# Patient Record
Sex: MALE | Race: WHITE
[De-identification: names, ages, dates, MRNs, and addresses within clinical notes are randomized per-mention and may not be internally consistent; named-entity substitution may affect disease eponyms.]

---

## 2021-10-08 ENCOUNTER — HOSPITAL ENCOUNTER (EMERGENCY)
Dept: HOSPITAL 97 - ER | Age: 81
Discharge: HOME | End: 2021-10-08
Payer: COMMERCIAL

## 2021-10-08 VITALS — OXYGEN SATURATION: 100 %

## 2021-10-08 VITALS — TEMPERATURE: 97.5 F

## 2021-10-08 VITALS — DIASTOLIC BLOOD PRESSURE: 70 MMHG | SYSTOLIC BLOOD PRESSURE: 113 MMHG

## 2021-10-08 DIAGNOSIS — Z91.041: ICD-10-CM

## 2021-10-08 DIAGNOSIS — K57.32: Primary | ICD-10-CM

## 2021-10-08 DIAGNOSIS — I10: ICD-10-CM

## 2021-10-08 DIAGNOSIS — Z88.0: ICD-10-CM

## 2021-10-08 LAB
ALBUMIN SERPL BCP-MCNC: 3.5 G/DL (ref 3.4–5)
ALP SERPL-CCNC: 92 U/L (ref 45–117)
ALT SERPL W P-5'-P-CCNC: 31 U/L (ref 12–78)
AST SERPL W P-5'-P-CCNC: 23 U/L (ref 15–37)
BUN BLD-MCNC: 17 MG/DL (ref 7–18)
GLUCOSE SERPLBLD-MCNC: 109 MG/DL (ref 74–106)
HCT VFR BLD CALC: 41.4 % (ref 39.6–49)
LIPASE SERPL-CCNC: 155 U/L (ref 73–393)
LYMPHOCYTES # SPEC AUTO: 1.2 K/UL (ref 0.7–4.9)
PMV BLD: 7.9 FL (ref 7.6–11.3)
POTASSIUM SERPL-SCNC: 4.1 MMOL/L (ref 3.5–5.1)
RBC # BLD: 4.61 M/UL (ref 4.33–5.43)

## 2021-10-08 PROCEDURE — 96375 TX/PRO/DX INJ NEW DRUG ADDON: CPT

## 2021-10-08 PROCEDURE — 96365 THER/PROPH/DIAG IV INF INIT: CPT

## 2021-10-08 PROCEDURE — 99284 EMERGENCY DEPT VISIT MOD MDM: CPT

## 2021-10-08 PROCEDURE — 80076 HEPATIC FUNCTION PANEL: CPT

## 2021-10-08 PROCEDURE — 96361 HYDRATE IV INFUSION ADD-ON: CPT

## 2021-10-08 PROCEDURE — 81003 URINALYSIS AUTO W/O SCOPE: CPT

## 2021-10-08 PROCEDURE — 81015 MICROSCOPIC EXAM OF URINE: CPT

## 2021-10-08 PROCEDURE — 36415 COLL VENOUS BLD VENIPUNCTURE: CPT

## 2021-10-08 PROCEDURE — 74176 CT ABD & PELVIS W/O CONTRAST: CPT

## 2021-10-08 PROCEDURE — 83690 ASSAY OF LIPASE: CPT

## 2021-10-08 PROCEDURE — 80048 BASIC METABOLIC PNL TOTAL CA: CPT

## 2021-10-08 PROCEDURE — 85025 COMPLETE CBC W/AUTO DIFF WBC: CPT

## 2021-10-08 PROCEDURE — 96367 TX/PROPH/DG ADDL SEQ IV INF: CPT

## 2021-10-08 NOTE — EDPHYS
Physician Documentation                                                                           

 [facility]                                                                 

Name: Mike Muller                                                                                

Age: 81 yrs                                                                                       

Sex: Male                                                                                         

: 1940                                                                                   

MRN: [id number]                                                                                   

Arrival Date: 10/08/2021                                                                          

Time: 10:42                                                                                       

Account#: [id number]                                                                            

Bed 10                                                                                            

Private MD:                                                                                       

ED Physician Jb Olivo                                                                         

HPI:                                                                                              

10/08                                                                                             

11:10 This 81 yrs old  Male presents to ER via Ambulatory with complaints of         rn  

      Abdominal Pain.                                                                             

11:10 The patient presents with abdominal pain in the left lower quadrant. Onset: The         rn  

      symptoms/episode began/occurred 2 day(s) ago. The symptoms do not radiate. Associated       

      signs and symptoms: Pertinent positives: constipation, Pertinent negatives: blood in        

      stools, fever. The symptoms are described as achy, crampy. Modifying factors: The           

      symptoms are alleviated by nothing, the symptoms are aggravated by touching the area.       

      Severity of pain: At its worst the pain was mild in the emergency department the pain       

      is unchanged. The patient has not experienced similar symptoms in the past. The patient     

      has not recently seen a physician. Patient reports left lower quadrant abdominal pain       

      that began 2 days ago, initially felt like was getting better but then worse again          

      today. Decreased appetite. Positive for constipation. No blood in stool or dark stool.      

                                                                                                  

Historical:                                                                                       

- Allergies:                                                                                      

10:49 PENICILLINS;                                                                            ll1 

10:49 Iodinated Contrast Media - IV Dye;                                                      ll1 

- PMHx:                                                                                           

10:49 GERD; Hyperlipidemia; Hypertension; Pancreatitis;                                       ll1 

- PSHx:                                                                                           

10:49 Cholecystectomy;                                                                        ll1 

                                                                                                  

- Immunization history:: Client reports receiving the 2nd dose of the Covid vaccine.              

- Social history:: Smoking status: Patient denies any tobacco usage or history of.                

- Family history:: not pertinent.                                                                 

- Hospitalizations: : No recent hospitalization is reported.                                      

                                                                                                  

                                                                                                  

ROS:                                                                                              

11:10 Constitutional: Negative for fever, chills, and weight loss, Eyes: Negative for injury, rn  

      pain, redness, and discharge, Neck: Negative for injury, pain, and swelling,                

      Cardiovascular: Negative for chest pain, palpitations, and edema, Respiratory: Negative     

      for shortness of breath, cough, wheezing, and pleuritic chest pain, Abdomen/GI:             

      Positive for left lower quadrant abdominal pain and anorexia as well as constipation        

      : Negative for injury, bleeding, discharge, and swelling, MS/Extremity: Negative for      

      injury and deformity, Skin: Negative for injury, rash, and discoloration, Neuro:            

      Negative for headache, weakness, numbness, tingling, and seizure.                           

11:10 All other systems are negative.                                                             

                                                                                                  

Exam:                                                                                             

11:10 Constitutional:  This is a well developed, well nourished patient who is awake, alert,  rn  

      and in no acute distress. Head/Face:  Normocephalic, atraumatic. Eyes:  Periorbital         

      areas with no swelling, redness, or edema. Cardiovascular:  Regular rate and rhythm.        

      No pulse deficits. Respiratory:  No increased work of breathing, no retractions or          

      nasal flaring. Abdomen/GI:  Soft, mild left lower quadrant tenderness without               

      peritoneal signs, no masses Skin:  Warm, dry  MS/ Extremity:  Pulses equal, no              

      cyanosis. Neuro:  Awake and alert, GCS 15                                                   

                                                                                                  

Vital Signs:                                                                                      

10:48  / 77; Pulse 75; Resp 17; Temp 97.5; Pulse Ox 96% ; Weight 77.11 kg; Height 5 ft. ll1 

      5 in. (165.10 cm); Pain 4/10;                                                               

12:20  / 71; Pulse 65; Resp 16; Pulse Ox 100% ;                                         vg1 

13:04  / 73; Pulse 65; Resp 16; Pulse Ox 100% ;                                         vg1 

13:50  / 67; Pulse 64; Resp 16; Pulse Ox 100% ;                                         vg1 

14:57  / 70; Pulse 62; Resp 16; Pulse Ox 100% ;                                         vg1 

10:48 Body Mass Index 28.29 (77.11 kg, 165.10 cm)                                             ll1 

                                                                                                  

MDM:                                                                                              

10:53 Patient medically screened.                                                             rn  

13:51 Differential diagnosis: appendicitis, bowel obstruction, diverticulitis, non-specific   rn  

      abd pain, Ureterolithiasis, urinary tract infection. Data reviewed: vital signs, nurses     

      notes, lab test result(s), radiologic studies, CT scan, and as a result, I will             

      discharge patient. Data interpreted: Cardiac monitor: rate is 64 beats/min, rhythm is       

      normal sinus rhythm, regular, with no ectopy, Interpretation: normal rate, normal           

      rhythm, Pulse oximetry: on room air is 100 %. Interpretation: normal. Counseling: I had     

      a detailed discussion with the patient and/or guardian regarding: the historical            

      points, exam findings, and any diagnostic results supporting the discharge/admit            

      diagnosis, lab results, radiology results, the need for outpatient follow up, to return     

      to the emergency department if symptoms worsen or persist or if there are any questions     

      or concerns that arise at home. Response to treatment: the patient's symptoms have          

      markedly improved after treatment, Reclined in bed with legs crossed and looks very         

      comfortable, and as a result, I will discharge patient. Special discussion: Based on        

      the patient's Hx, exam, and Dx evaluation, there is no indication for emergent surgery      

      or inpatient Tx. It is understood by the patient/guardian that if the Sx's persist or       

      worsen they need to return immediately for re-evaluation. I discussed with the              

      patient/guardian in detail that at this point there is no indication for admission to       

      the hospital. It is understood, however, that if the symptoms persist or worsen the         

      patient needs to return immediately for re-evaluation. Based on the history and exam        

      findings, there is no indication for further emergent testing or inpatient evaluation.      

      I discussed with the patient/guardian the need to see the primary care provider for         

      further evaluation of the symptoms. ED course: CT shows mild to moderate                    

      diverticulitis. Patient with normal vitals and well-appearing. Given IV antibiotics         

      here. Will DC home with antibiotics and return precautions given and understood.            

      Patient happy that he is going home..                                                       

                                                                                                  

10/08                                                                                             

10:58 Order name: Basic Metabolic Panel; Complete Time: 13:03                                 rn  

10/08                                                                                             

10:58 Order name: CBC with Diff; Complete Time: 12:38                                         rn  

10/08                                                                                             

10:58 Order name: Hepatic Function; Complete Time: 13:03                                      rn  

10/08                                                                                             

10:58 Order name: Lipase; Complete Time: 13:03                                                rn  

10/08                                                                                             

10:58 Order name: Urine Microscopic Only; Complete Time: 13:03                                rn  

10/08                                                                                             

11:33 Order name: Urine Dipstick-Ancillary; Complete Time: 12:38                              EDMS

10/08                                                                                             

10:58 Order name: IV Saline Lock; Complete Time: 11:23                                        rn  

10/08                                                                                             

10:58 Order name: Labs collected and sent; Complete Time: 11:23                               rn  

10/08                                                                                             

12:42 Order name: Abdomen ; Complete Time: 13:03                                              EDMS

10/08                                                                                             

10:58 Order name: Urine Dipstick-Ancillary (obtain specimen); Complete Time: 11:33            rn  

                                                                                                  

Administered Medications:                                                                         

11:18 Drug: Zofran (Ondansetron) 4 mg Route: IVP; Site: right wrist;                          vg1 

12:20 Follow up: Response: No adverse reaction                                                vg1 

11:18 Drug: NS 0.9% 500 ml Route: IV; Rate: bolus; Site: right wrist;                         vg1 

12:19 Follow up: IV Status: Completed infusion; IV Intake: 500ml                              vg1 

13:13 Drug: Flagyl (metroNIDAZOLE) 500 mg Volume: 100 ml; Route: IVPB; Rate: 200 ml/hr;       vg1 

      Infused Over: 30 mins; Site: right wrist;                                                   

13:49 Follow up: IV Status: Completed infusion; IV Intake: 100ml                              vg1 

13:50 Drug: Cipro (ciprofloxacin) 400 mg Volume: 200 ml; Route: IVPB; Infused Over: 60 mins;  vg1 

      Site: right wrist;                                                                          

14:56 Follow up: IV Status: Completed infusion; IV Intake: 200ml                              vg1 

                                                                                                  

                                                                                                  

Disposition Summary:                                                                              

10/08/21 13:52                                                                                    

Discharge Ordered                                                                                 

      Location: Home                                                                          rn  

      Problem: new                                                                            rn  

      Symptoms: have improved                                                                 rn  

      Condition: Stable                                                                       rn  

      Diagnosis                                                                                   

        - Diverticulitis of large intestine without perforation or abscess without bleeding   rn  

      Followup:                                                                               rn  

        - With: Private Physician                                                                  

        - When: 2 - 3 days                                                                         

        - Reason: Recheck today's complaints, Re-evaluation by your physician                      

      Discharge Instructions:                                                                     

        - Discharge Summary Sheet                                                             rn  

        - High-Fiber Diet                                                                     rn  

        - Diverticulitis                                                                      rn  

      Forms:                                                                                      

        - Medication Reconciliation Form                                                      rn  

        - Thank You Letter                                                                    rn  

        - Antibiotic Education                                                                rn  

        - Prescription Opioid Use                                                             rn  

      Prescriptions:                                                                              

        - ondansetron 4 mg Oral tablet,disintegrating                                              

            - take 1 tablet by ORAL route every 8 hours As needed; 20 tablet; Refills: 0,     rn  

      Product Selection Permitted                                                                 

        - Flagyl 500 mg Oral Tablet                                                                

            - take 1 tablet by ORAL route every 8 hours for 10 days; 30 tablet; Refills: 0,   rn  

      Product Selection Permitted                                                                 

        - Cipro 500 mg Oral Tablet                                                                 

            - take 1 tablet by ORAL route every 12 hours for 7 days; 14 tablet; Refills: 0,   rn  

      Product Selection Permitted                                                                 

        - Tramadol 50 mg Oral Tablet                                                               

            - take 1 tablet by ORAL route every 8 hours as needed; 15 tablet; Refills: 0,     rn  

      Product Selection Permitted                                                                 

Signatures:                                                                                       

Dispatcher [employer]                           EDMS                                                 

Jb Olivo MD MD rn Garcia, Victoria RN                    RN   vg1                                                  

Cindy Nevarez RN                       RN   ll1                                                  

                                                                                                  

Corrections: (The following items were deleted from the chart)                                    

12:42 10:59 Abdomen Pelvis W [id number] ordered. EDMS                                     EDMS

                                                                                                  

**************************************************************************************************

## 2021-10-08 NOTE — RAD REPORT
EXAM DESCRIPTION:  CT - Abdomen   Pelvis Wo Contrast - 10/8/2021 12:44 pm

 

CLINICAL HISTORY:  Abdominal  pain

 

COMPARISON:  2016

 

TECHNIQUE:  Computed axial tomography of the abdomen and pelvis was obtained. IV and oral contrast we
re not requested.

 

All CT scans are performed using dose optimization technique as appropriate and may include automated
 exposure control or mA/KV adjustment according to patient size.

 

FINDINGS:   The evaluation of solid organs, vessels and bowel is limited secondary to the lack of con
trast administration.

 

2.1 centimeter hepatic cyst.

 

Cholecystectomy.

 

Spleen, pancreas and adrenals appear grossly normal.

 

3 centimeter low-density mass left kidney probably a cyst. Right kidney grossly normal.

 

4.8 centimeter diverticulum stems from the third portion of the duodenum.

 

Normal appendix

 

Diverticula stem from the colon. Mild to moderate stranding adjacent to the sigmoid. No free air. No 
abscess

 

Prostate gland is mildly to moderately enlarged. Small bilateral inguinal hernias. Small bladder dive
rticulum suspected

 

Small hiatal hernia

 

IMPRESSION:  Mild to moderate sigmoid diverticulitis

## 2021-10-08 NOTE — ER
Nurse's Notes                                                                                     

 [facility]                                                                 

Name: Mike Muller                                                                                

Age: 81 yrs                                                                                       

Sex: Male                                                                                         

: 1940                                                                                   

MRN: [id number]                                                                                   

Arrival Date: 10/08/2021                                                                          

Time: 10:42                                                                                       

Account#: [id number]                                                                            

Bed 10                                                                                            

Private MD:                                                                                       

Diagnosis: Diverticulitis of large intestine without perforation or abscess without bleeding      

                                                                                                  

Presentation:                                                                                     

10/08                                                                                             

10:48 Chief complaint: Patient states: Lower abd pain with constipation for 3-4 days. Sent by [facility] 

      Dr. Slaughter for further eval. Coronavirus screen: Client denies travel out of the [place]       

      in the last 14 days. At this time, the client does not indicate any symptoms associated     

      with coronavirus-19. Ebola Screen: Patient denies travel to an Ebola-affected area in       

      the 21 days before illness onset. Initial Sepsis Screen: Does the patient meet any 2        

      criteria? No. Patient's initial sepsis screen is negative. Does the patient have a          

      suspected source of infection? Yes: Acute abdominal pain. Risk Assessment: Do you want      

      to hurt yourself or someone else? Patient reports no desire to harm self or others.         

      Onset of symptoms was 2021.                                                     

10:48 Method Of Arrival: Ambulatory                                                           [facility] 

10:48 Acuity: ELAINE 3                                                                           ll1 

                                                                                                  

Historical:                                                                                       

- Allergies:                                                                                      

10:49 PENICILLINS;                                                                            ll1 

10:49 Iodinated Contrast Media - IV Dye;                                                      ll1 

- PMHx:                                                                                           

10:49 GERD; Hyperlipidemia; Hypertension; Pancreatitis;                                       ll1 

- PSHx:                                                                                           

10:49 Cholecystectomy;                                                                        ll1 

                                                                                                  

- Immunization history:: Client reports receiving the 2nd dose of the Covid vaccine.              

- Social history:: Smoking status: Patient denies any tobacco usage or history of.                

- Family history:: not pertinent.                                                                 

- Hospitalizations: : No recent hospitalization is reported.                                      

                                                                                                  

                                                                                                  

Screenin:26 Abuse screen: Denies threats or abuse. Nutritional screening: No deficits noted.        vg1 

      Tuberculosis screening: No symptoms or risk factors identified. Fall Risk No fall in        

      past 12 months (0 pts). No secondary diagnosis (0 pts). IV access (20 points).              

      Ambulatory Aid- Gait- Normal/Bed Rest/Wheelchair (0 pts) Mental Status- Oriented to own     

      ability (0 pts). Total Box Fall Scale indicates No Risk (0-24 pts).                       

                                                                                                  

Assessment:                                                                                       

11:10 General: Appears in no apparent distress. comfortable, Behavior is calm, cooperative.   vg1 

      Pain: Complains of pain in right lower quadrant and left lower quadrant Pain currently      

      is 4 out of 10 on a pain scale. Pain began 2-3 days ago. Neuro: Level of Consciousness      

      is awake, alert, obeys commands, Oriented to person, place, time, situation.                

      Cardiovascular: Patient's skin is warm and dry. Respiratory: Airway is patent               

      Respiratory effort is even, unlabored. GI: Bowel sounds present X 4 quads. Abd is soft      

      and non tender Reports constipation, BM this morning, states was 'hard' Patient             

      currently denies diarrhea, nausea, vomiting. : No signs and/or symptoms were reported     

      regarding the genitourinary system. EENT: No signs and/or symptoms were reported            

      regarding the EENT system. Derm: Skin is intact, is healthy with good turgor.               

      Musculoskeletal: Circulation, motion, and sensation intact.                                 

12:20 Reassessment: Patient appears in no apparent distress at this time. No changes from     vg1 

      previously documented assessment. Patient and/or family updated on plan of care and         

      expected duration. Pain level reassessed. Patient is alert, oriented x 3, equal             

      unlabored respirations, skin warm/dry/pink.                                                 

13:04 Reassessment: Patient appears in no apparent distress at this time. Patient and/or      vg1 

      family updated on plan of care and expected duration. Pain level reassessed. Patient is     

      alert, oriented x 3, equal unlabored respirations, skin warm/dry/pink. Patient states       

      feeling better.                                                                             

13:50 Reassessment: Patient appears in no apparent distress at this time. Patient and/or      vg1 

      family updated on plan of care and expected duration. Pain level reassessed. Patient is     

      alert, oriented x 3, equal unlabored respirations, skin warm/dry/pink.                      

13:53 Reassessment: Pt up for d/c, currently waiting for IV antibiotics to complete.          vg1 

14:57 Reassessment: Patient appears in no apparent distress at this time. Patient and/or      vg1 

      family updated on plan of care and expected duration. Pain level reassessed. Patient is     

      alert, oriented x 3, equal unlabored respirations, skin warm/dry/pink. Patient states       

      feeling better.                                                                             

                                                                                                  

Vital Signs:                                                                                      

10:48  / 77; Pulse 75; Resp 17; Temp 97.5; Pulse Ox 96% ; Weight 77.11 kg; Height 5 ft. ll1 

      5 in. (165.10 cm); Pain 4/10;                                                               

12:20  / 71; Pulse 65; Resp 16; Pulse Ox 100% ;                                         vg1 

13:04  / 73; Pulse 65; Resp 16; Pulse Ox 100% ;                                         vg1 

13:50  / 67; Pulse 64; Resp 16; Pulse Ox 100% ;                                         vg1 

14:57  / 70; Pulse 62; Resp 16; Pulse Ox 100% ;                                         vg1 

10:48 Body Mass Index 28.29 (77.11 kg, 165.10 cm)                                             ll1 

                                                                                                  

ED Course:                                                                                        

10:42 Patient arrived in ED.                                                                  rg4 

10:47 Jb Olivo MD is Attending Physician.                                                rn  

10:49 Triage completed.                                                                       ll1 

10:50 Iliana Damon RN is Primary Nurse.                                                  vg1 

10:50 Arm band placed on Patient placed.                                                      ll1 

11:23 Initial lab(s) drawn, by me, sent to lab. Inserted saline lock: 20 gauge in right       vg1 

      wrist, using aseptic technique. Blood collected.                                            

11:26 Patient has correct armband on for positive identification. Bed in low position. Call   vg1 

      light in reach. Side rails up X 1. Adult w/ patient.                                        

11:26 No provider procedures requiring assistance completed.                                  vg1 

12:43 Abdomen In Process Unspecified.                                                         EDMS

14:56 IV discontinued, intact, bleeding controlled, No redness/swelling at site. Pressure     vg1 

      dressing applied.                                                                           

                                                                                                  

Administered Medications:                                                                         

11:18 Drug: Zofran (Ondansetron) 4 mg Route: IVP; Site: right wrist;                          vg1 

12:20 Follow up: Response: No adverse reaction                                                vg1 

11:18 Drug: NS 0.9% 500 ml Route: IV; Rate: bolus; Site: right wrist;                         vg1 

12:19 Follow up: IV Status: Completed infusion; IV Intake: 500ml                              vg1 

13:13 Drug: Flagyl (metroNIDAZOLE) 500 mg Volume: 100 ml; Route: IVPB; Rate: 200 ml/hr;       vg1 

      Infused Over: 30 mins; Site: right wrist;                                                   

13:49 Follow up: IV Status: Completed infusion; IV Intake: 100ml                              vg1 

13:50 Drug: Cipro (ciprofloxacin) 400 mg Volume: 200 ml; Route: IVPB; Infused Over: 60 mins;  vg1 

      Site: right wrist;                                                                          

14:56 Follow up: IV Status: Completed infusion; IV Intake: 200ml                              vg1 

                                                                                                  

                                                                                                  

Intake:                                                                                           

12:19 IV: 500ml; Total: 500ml.                                                                vg1 

13:49 IV: 100ml; Total: 600ml.                                                                vg1 

14:56 IV: 200ml; Total: 800ml.                                                                vg1 

                                                                                                  

Outcome:                                                                                          

13:52 Discharge ordered by MD.                                                                rn  

14:56 Discharged to home ambulatory, with family.                                             vg1 

14:56 Condition: stable                                                                           

14:56 Discharge instructions given to patient, family, Instructed on discharge instructions,      

      follow up and referral plans. medication usage, Demonstrated understanding of               

      instructions, follow-up care, medications, Prescriptions given X 4.                         

14:57 Patient left the ED.                                                                    vg1 

                                                                                                  

Signatures:                                                                                       

Dispatcher [employer]                           EDMS                                                 

Jb Olivo MD MD rn Garcia, Rubi rg4                                                  

Iliana Damon RN                    RN   vg1                                                  

Cindy Nevarez RN                       RN   ll1                                                  

                                                                                                  

**************************************************************************************************

## 2021-10-13 ENCOUNTER — HOSPITAL ENCOUNTER (EMERGENCY)
Dept: HOSPITAL 97 - ER | Age: 81
Discharge: HOME | End: 2021-10-13
Payer: COMMERCIAL

## 2021-10-13 VITALS — SYSTOLIC BLOOD PRESSURE: 133 MMHG | OXYGEN SATURATION: 97 % | DIASTOLIC BLOOD PRESSURE: 84 MMHG

## 2021-10-13 VITALS — TEMPERATURE: 98.3 F

## 2021-10-13 DIAGNOSIS — I10: ICD-10-CM

## 2021-10-13 DIAGNOSIS — K57.32: Primary | ICD-10-CM

## 2021-10-13 DIAGNOSIS — Z91.041: ICD-10-CM

## 2021-10-13 DIAGNOSIS — Z88.0: ICD-10-CM

## 2021-10-13 LAB
ALBUMIN SERPL BCP-MCNC: 4 G/DL (ref 3.4–5)
ALP SERPL-CCNC: 95 U/L (ref 45–117)
ALT SERPL W P-5'-P-CCNC: 68 U/L (ref 12–78)
AST SERPL W P-5'-P-CCNC: 67 U/L (ref 15–37)
BUN BLD-MCNC: 12 MG/DL (ref 7–18)
GLUCOSE SERPLBLD-MCNC: 132 MG/DL (ref 74–106)
HCT VFR BLD CALC: 45.8 % (ref 39.6–49)
LIPASE SERPL-CCNC: 169 U/L (ref 73–393)
LYMPHOCYTES # SPEC AUTO: 0.8 K/UL (ref 0.7–4.9)
PMV BLD: 7.6 FL (ref 7.6–11.3)
POTASSIUM SERPL-SCNC: 3.7 MMOL/L (ref 3.5–5.1)
RBC # BLD: 5.11 M/UL (ref 4.33–5.43)

## 2021-10-13 PROCEDURE — 80048 BASIC METABOLIC PNL TOTAL CA: CPT

## 2021-10-13 PROCEDURE — 74177 CT ABD & PELVIS W/CONTRAST: CPT

## 2021-10-13 PROCEDURE — 80076 HEPATIC FUNCTION PANEL: CPT

## 2021-10-13 PROCEDURE — 36415 COLL VENOUS BLD VENIPUNCTURE: CPT

## 2021-10-13 PROCEDURE — 83690 ASSAY OF LIPASE: CPT

## 2021-10-13 PROCEDURE — 85025 COMPLETE CBC W/AUTO DIFF WBC: CPT

## 2021-10-13 PROCEDURE — 81003 URINALYSIS AUTO W/O SCOPE: CPT

## 2021-10-13 NOTE — ER
Nurse's Notes                                                                                     

 [facility]                                                                 

Name: Mike Muller                                                                                

Age: 81 yrs                                                                                       

Sex: Male                                                                                         

: 1940                                                                                   

MRN: [id number]                                                                                   

Arrival Date: 10/13/2021                                                                          

Time: 06:34                                                                                       

Account#: [id number]                                                                            

Bed 24                                                                                            

Private MD:                                                                                       

Diagnosis: Diverticulitis                                                                         

                                                                                                  

Presentation:                                                                                     

10/13                                                                                             

06:42 Chief complaint: Patient states: Pt states nausea and fever. Coronavirus screen:        df1 

      Vaccine status: Patient reports receiving the 2nd dose of the covid vaccine. The client     

      denies any previous COVID testing. Ebola Screen: Patient negative for fever greater         

      than or equal to 101.5 degrees Fahrenheit, and additional compatible Ebola Virus            

      Disease symptoms Patient denies exposure to infectious person. Patient denies travel to     

      an Ebola-affected area in the 21 days before illness onset. Initial Sepsis Screen: Does     

      the patient meet any 2 criteria? No. Patient's initial sepsis screen is negative. Does      

      the patient have a suspected source of infection? No. Patient's initial sepsis screen       

      is negative. Risk Assessment: Do you want to hurt yourself or someone else? Patient         

      reports no desire to harm self or others. Note Pt seen in ER last week Dx with              

      diverticulitis sent home with antibiotics. Pt states last night starting to feel            

      nauseated nd fever. Denies V/D/pain. Onset of symptoms was 2021.                

06:42 Method Of Arrival: Ambulatory                                                           df1 

06:42 Acuity: ELAINE 3                                                                           df1 

                                                                                                  

Historical:                                                                                       

- Allergies:                                                                                      

06:46 Iodinated Contrast Media - IV Dye;                                                      df1 

06:46 PENICILLINS;                                                                            df1 

- Home Meds:                                                                                      

06:46 amlodipine-benazepril 10-20 mg Oral cap 1 cap once daily for Hypertension [Active];     df1 

      pantoprazole 40 mg Oral TbEC 1 tab once daily for Gastroesophageal reflux [Active];         

      simvastatin 20 mg Oral tab 1 tab once daily for Hypercholesterolemia [Active]; aspirin      

      81 mg Oral tab 81 mg daily [Active];                                                        

- PMHx:                                                                                           

06:46 GERD; Hyperlipidemia; Hypertension; Pancreatitis; Diverticulitis; DVT left leg;         df1 

- PSHx:                                                                                           

06:46 Cholecystectomy;                                                                        df1 

                                                                                                  

- Immunization history:: Adult Immunizations up to date, Client reports having NOT                

  received the Covid vaccine.                                                                     

- Social history:: Smoking status: Patient/guardian denies using tobacco.                         

                                                                                                  

                                                                                                  

Screenin:59 Abuse screen: Denies threats or abuse. Denies injuries from another. Nutritional        oh  

      screening: No deficits noted. Tuberculosis screening: No symptoms or risk factors           

      identified. Fall Risk None identified.                                                      

                                                                                                  

Assessment:                                                                                       

07:57 General: Appears uncomfortable, Behavior is calm, cooperative, appropriate for age.     oh  

      Pain: Denies pain. Neuro: No deficits noted. Cardiovascular: No deficits noted.             

      Respiratory: No deficits noted. GI: Reports pt reports he just dont feel good, states       

      he had diverticulitis last last week, denies pain at this time, reports nausea and just     

      not feeling well.                                                                           

08:02 General: 0720- pt drank all po contrast. 0745-pt refused morphine, states he dont have  oh  

      pain..                                                                                      

                                                                                                  

Vital Signs:                                                                                      

06:42  / 97; Pulse 90; Resp 18; Temp 98.3; Pulse Ox 100% on R/A; Weight 77.11 kg;       df1 

      Height 5 ft. 5 in. (165.10 cm); Pain 0/10;                                                  

08:03  / 83; Pulse 76; Resp 16; Pulse Ox 97% ; Pain 0/10;                               oh  

09:19  / 76; Pulse 64; Resp 15; Pulse Ox 94% ; Pain 0/10;                               oh  

10:29  / 84; Pulse 70; Resp 15; Pulse Ox 97% ; Pain 0/10;                               tc5 

06:42 Body Mass Index 28.29 (77.11 kg, 165.10 cm)                                             df1 

                                                                                                  

ED Course:                                                                                        

06:34 Patient arrived in ED.                                                                    

06:37 Chandler Nguyễn PA is PHCP.                                                              [facility] 

06:37 Dave Nolan MD is Attending Physician.                                             [facility] 

06:46 Triage completed.                                                                       df1 

07:22 Brian Diaz, RN is Primary Nurse.                                                   oh  

08:00 Inserted saline lock: 20 gauge in left forearm, using aseptic technique. Blood          oh  

      collected.                                                                                  

09:17 CT Abd/Pelvis - PO Contrast Only In Process Unspecified.                                EDMS

10:30 No provider procedures requiring assistance completed. IV discontinued, intact,         tc5 

      bleeding controlled, No redness/swelling at site. Pressure dressing applied.                

10:30 Arm band placed on right wrist.                                                         tc5 

10:31 Patient has correct armband on for positive identification.                             tc5 

                                                                                                  

Administered Medications:                                                                         

07:45 Drug: Zofran (Ondansetron) 4 mg Route: IVP; Site: left forearm;                         oh  

08:03 Follow up: Response: No adverse reaction                                                oh  

10:18 Follow up: Response: No adverse reaction                                                tc5 

08:04 Not Given (Patient Refused): morphine 2 mg IVP once; (PAIN>8) RASS on ADMN: Combtv4,    oh  

      Very Agttd3, Agttd2, Rstlss1, AlertClm0, Drwsy-1, LtSdtn-2, ModSdtn-3, DpSdtn-4,            

      UnArsble-5 x2                                                                               

                                                                                                  

                                                                                                  

Outcome:                                                                                          

09:58 Discharge ordered by MD. heranndez 

10:30 Discharged to home                                                                      tc5 

10:30 Condition: stable                                                                           

10:30 Discharge instructions given to patient.                                                    

10:35 Patient left the ED.                                                                    tc5 

                                                                                                  

Signatures:                                                                                       

Dispatcher [employer]                           EDMS                                                 

Chandler Nguyễn PA PA jmm Marsh, Wendy wm Furlich, Dawn                                df1                                                  

Brian Diaz, RN                     RN   oh                                                   

Lilian Owens RN                  RN   tc5                                                  

                                                                                                  

**************************************************************************************************

## 2021-10-13 NOTE — RAD REPORT
EXAM DESCRIPTION:  CTAbdomen   Pelvis W Contrast - 10/13/2021 9:17 am

 

CLINICAL HISTORY:

abdominal pain

 

COMPARISON:  Abdomen   Pelvis Wo Contrast dated 10/8/2021

 

TECHNIQUE:  CT of the abdomen and pelvis was performed.

 

All CT scans are performed using dose optimization technique as appropriate and may include automated
 exposure control or mA/KV adjustment according to patient size.

 

FINDINGS:  Lower chest: No acute abnormality. Small hiatal hernia.

Liver: Multiple low-attenuation liver lesions are noted which are too small to characterize. There ar
e statistically benign.

Biliary: Cholecystectomy.

Stomach: No significant focal abnormality.

Duodenum: No significant focal abnormality.

Pancreas: No significant abnormality.

Spleen: No significant abnormality.

Adrenal: No suspicious lesions.

Kidney/ureter: No hydronephrosis. No renal calculi. Left renal cysts noted.

Retroperitoneum: No retroperitoneal adenopathy.

Vascular: No aneurysm.

Bowel: Inflammatory changes along the sigmoid colon have improved though not completely resolved. Ext
ensive diverticulosis. Normal appendix..

Peritoneum: No ascites or free air.

Bladder: Small bladder diverticulum along the right aspect of the bladder.

Reproductive: No adnexal masses.

Bones: No acute fracture.

Other: n/a

 

IMPRESSION:  Improving inflammatory changes at the sigmoid colon compared with 10/08/2021. No perfora
tion or abscess. No new acute findings identified.

## 2021-10-13 NOTE — EDPHYS
Physician Documentation                                                                           

 [facility]                                                                 

Name: Mike Muller                                                                                

Age: 81 yrs                                                                                       

Sex: Male                                                                                         

: 1940                                                                                   

MRN: [id number]                                                                                   

Arrival Date: 10/13/2021                                                                          

Time: 06:34                                                                                       

Account#: [id number]                                                                            

Bed 24                                                                                            

Private MD:                                                                                       

Dave Ziegler                                                                      

HPI:                                                                                              

10/13                                                                                             

07:06 This 81 yrs old  Male presents to ER via Ambulatory with complaints of         jmm 

      abdominal pain.                                                                             

07:06 The patient presents with abdominal pain in the left lower quadrant. Onset: The         jmm 

      symptoms/episode began/occurred gradually. The symptoms do not radiate. Associated          

      signs and symptoms: Pertinent positives: fever, nausea. The symptoms are described as       

      achy. Modifying factors: The symptoms are alleviated by nothing, the symptoms are           

      aggravated by nothing. The patient has experienced similar episodes in the past. This       

      is a 81-year-old male with history of GERD, hyperlipidemia, hypertension, pancreatitis,     

      diverticulitis that presents emerge department with complaints of progressively             

      worsening left lower quadrant abdominal pain along with chills, weakness. Patient is        

      currently taking oral antibiotics and was diagnosed with diverticulitis on  of     

      this month..                                                                                

                                                                                                  

Historical:                                                                                       

- Allergies:                                                                                      

06:46 Iodinated Contrast Media - IV Dye;                                                      df1 

06:46 PENICILLINS;                                                                            df1 

- Home Meds:                                                                                      

06:46 amlodipine-benazepril 10-20 mg Oral cap 1 cap once daily for Hypertension [Active];     df1 

      pantoprazole 40 mg Oral TbEC 1 tab once daily for Gastroesophageal reflux [Active];         

      simvastatin 20 mg Oral tab 1 tab once daily for Hypercholesterolemia [Active]; aspirin      

      81 mg Oral tab 81 mg daily [Active];                                                        

- PMHx:                                                                                           

06:46 GERD; Hyperlipidemia; Hypertension; Pancreatitis; Diverticulitis; DVT left leg;         df1 

- PSHx:                                                                                           

06:46 Cholecystectomy;                                                                        df1 

                                                                                                  

- Immunization history:: Adult Immunizations up to date, Client reports having NOT                

  received the Covid vaccine.                                                                     

- Social history:: Smoking status: Patient/guardian denies using tobacco.                         

                                                                                                  

                                                                                                  

ROS:                                                                                              

07:06 Cardiovascular: Negative for chest pain, palpitations, and edema, Respiratory: Negative jmm 

      for shortness of breath, cough, wheezing, and pleuritic chest pain.                         

07:06 Constitutional: Positive for body aches, chills.                                            

07:06 Abdomen/GI: Positive for abdominal pain, nausea.                                            

07:06 All other systems are negative.                                                             

                                                                                                  

Exam:                                                                                             

07:06 Constitutional:  This is a well developed, well nourished patient who is awake, alert,  jmm 

      and in no acute distress. Head/Face:  atraumatic. Eyes:  EOMI, no conjunctival erythema     

      appreciated ENT:  Moist Mucus Membranes Neck:  Trachea midline, Supple Chest/axilla:        

      Normal chest wall appearance and motion.   Cardiovascular:  Regular rate and rhythm.        

      No edema appreciated Respiratory:  Normal respirations, no respiratory distress             

      appreciated Back:  Normal ROM                                                               

07:06 Skin:  General appearance color normal MS/ Extremity:  Moves all extremities, no            

      obvious deformities appreciated, no edema noted to the lower extremities  Neuro:  Awake     

      and alert, normal gait Psych:  Behavior is normal, Mood is normal, Patient is               

      cooperative and pleasant                                                                    

07:06 Abdomen/GI: Inspection: abdomen appears normal, Bowel sounds: normal, Palpation: soft,      

      mild abdominal tenderness, in the left lower quadrant.                                      

                                                                                                  

Vital Signs:                                                                                      

06:42  / 97; Pulse 90; Resp 18; Temp 98.3; Pulse Ox 100% on R/A; Weight 77.11 kg;       df1 

      Height 5 ft. 5 in. (165.10 cm); Pain 0/10;                                                  

08:03  / 83; Pulse 76; Resp 16; Pulse Ox 97% ; Pain 0/10;                               oh  

09:19  / 76; Pulse 64; Resp 15; Pulse Ox 94% ; Pain 0/10;                               oh  

10:29  / 84; Pulse 70; Resp 15; Pulse Ox 97% ; Pain 0/10;                               tc5 

06:42 Body Mass Index 28.29 (77.11 kg, 165.10 cm)                                             df1 

                                                                                                  

MDM:                                                                                              

06:53 Patient medically screened.                                                             [facility] 

09:57 Data reviewed: vital signs, nurses notes. Counseling: I had a detailed discussion with  david 

      the patient and/or guardian regarding: the historical points, exam findings, and any        

      diagnostic results supporting the discharge/admit diagnosis, lab results, radiology         

      results, the need for outpatient follow up, to return to the emergency department if        

      symptoms worsen or persist or if there are any questions or concerns that arise at          

      home. ED course: CT and labs pressure improvement. Patient is advised to switch to a        

      clear liquid diet and begin to take probiotics along with his prescribed antibiotics.       

      Patient otherwise given strict return precautions. Patient understood and agrees plan       

      of care..                                                                                   

                                                                                                  

10/13                                                                                             

07:05 Order name: Basic Metabolic Panel; Complete Time: 08:19                                 [facility] 

10/13                                                                                             

07:05 Order name: CBC with Diff; Complete Time: 08:19                                         [facility] 

10/13                                                                                             

07:05 Order name: Hepatic Function; Complete Time: 08:19                                      [facility] 

10/13                                                                                             

07:05 Order name: Lipase; Complete Time: 08:19                                                [facility] 

10/13                                                                                             

07:05 Order name: CT Abd/Pelvis - PO Contrast Only; Complete Time: 09:39                      [facility] 

10/13                                                                                             

07:41 Order name: Urine Dipstick-Ancillary; Complete Time: 07:58                              [facility]

10/13                                                                                             

07:05 Order name: IV Saline Lock; Complete Time: 07:54                                        [facility] 

10/13                                                                                             

07:05 Order name: Labs collected and sent; Complete Time: 07:54                               [facility] 

10/13                                                                                             

07:05 Order name: Urine Dipstick-Ancillary (obtain specimen); Complete Time: 07:37            [facility] 

                                                                                                  

Administered Medications:                                                                         

07:45 Drug: Zofran (Ondansetron) 4 mg Route: IVP; Site: left forearm;                         oh  

08:03 Follow up: Response: No adverse reaction                                                oh  

10:18 Follow up: Response: No adverse reaction                                                tc5 

08:04 Not Given (Patient Refused): morphine 2 mg IVP once; (PAIN>8) RASS on ADMN: Combtv4,    oh  

      Very Agttd3, Agttd2, Rstlss1, AlertClm0, Drwsy-1, LtSdtn-2, ModSdtn-3, DpSdtn-4,            

      UnArsble-5 x2                                                                               

                                                                                                  

                                                                                                  

Disposition:                                                                                      

10/14                                                                                             

06:34 Co-signature as Attending Physician, Dave Nolan MD I agree with the assessment and  lewis 

      plan of care.                                                                               

                                                                                                  

Disposition Summary:                                                                              

10/13/21 09:58                                                                                    

Discharge Ordered                                                                                 

      Location: Home                                                                          [facility] 

      Condition: Stable                                                                       [facility] 

      Diagnosis                                                                                   

        - Diverticulitis                                                                      [facility] 

      Followup:                                                                               [facility] 

        - With: Private Physician                                                                  

        - When: 2 - 3 days                                                                         

        - Reason: Recheck today's complaints, Continuance of care, Re-evaluation by your           

      physician                                                                                   

      Discharge Instructions:                                                                     

        - Discharge Summary Sheet                                                             [facility] 

        - Clear Liquid Diet, Adult                                                            jmm 

        - Diverticulitis                                                                      [facility] 

      Forms:                                                                                      

        - Medication Reconciliation Form                                                      [facility] 

        - Thank You Letter                                                                    [facility] 

        - Antibiotic Education                                                                [facility] 

        - Prescription Opioid Use                                                             [facility] 

      Prescriptions:                                                                              

        - Pepcid 20 mg Oral Tablet                                                                 

            - take 1 tablet by ORAL route every 12 hours for 10 days; 20 tablet; Refills: 0,  jmm 

      Product Selection Permitted                                                                 

Signatures:                                                                                       

Dispatcher [employer]                           Dave Jacobs MD MD cha Mickail, Joel, PA PA jmm Furlich, Dawn                                df1                                                  

Brian Diaz, RN                     RN   oh                                                   

Lilian Owens                      RN   tc5                                                  

                                                                                                  

**************************************************************************************************